# Patient Record
Sex: FEMALE | Race: WHITE | NOT HISPANIC OR LATINO | Employment: FULL TIME | URBAN - METROPOLITAN AREA
[De-identification: names, ages, dates, MRNs, and addresses within clinical notes are randomized per-mention and may not be internally consistent; named-entity substitution may affect disease eponyms.]

---

## 2017-01-02 ENCOUNTER — APPOINTMENT (OUTPATIENT)
Dept: GENERAL RADIOLOGY | Facility: HOSPITAL | Age: 37
End: 2017-01-02

## 2017-01-02 ENCOUNTER — HOSPITAL ENCOUNTER (EMERGENCY)
Facility: HOSPITAL | Age: 37
Discharge: HOME | End: 2017-01-03
Attending: EMERGENCY MEDICINE | Admitting: EMERGENCY MEDICINE

## 2017-01-02 DIAGNOSIS — R60.9 PERIPHERAL EDEMA: Primary | ICD-10-CM

## 2017-01-02 DIAGNOSIS — R06.02 SHORTNESS OF BREATH: ICD-10-CM

## 2017-01-02 LAB
ALBUMIN SERPL-MCNC: 4.5 G/DL (ref 3.5–5.2)
ALBUMIN/GLOB SERPL: 1.7 G/DL
ALP SERPL-CCNC: 75 U/L (ref 39–117)
ALT SERPL W P-5'-P-CCNC: 68 U/L (ref 1–33)
ANION GAP SERPL CALCULATED.3IONS-SCNC: 11.4 MMOL/L
AST SERPL-CCNC: 37 U/L (ref 1–32)
BASOPHILS # BLD AUTO: 0.04 10*3/MM3 (ref 0–0.2)
BASOPHILS NFR BLD AUTO: 0.4 % (ref 0–1.5)
BILIRUB SERPL-MCNC: 0.2 MG/DL (ref 0.1–1.2)
BILIRUB UR QL STRIP: NEGATIVE
BUN BLD-MCNC: 12 MG/DL (ref 6–20)
BUN/CREAT SERPL: 17.6 (ref 7–25)
CALCIUM SPEC-SCNC: 9.9 MG/DL (ref 8.6–10.5)
CHLORIDE SERPL-SCNC: 102 MMOL/L (ref 98–107)
CLARITY UR: CLEAR
CO2 SERPL-SCNC: 25.6 MMOL/L (ref 22–29)
COLOR UR: YELLOW
CREAT BLD-MCNC: 0.68 MG/DL (ref 0.57–1)
DEPRECATED RDW RBC AUTO: 41.3 FL (ref 37–54)
EOSINOPHIL # BLD AUTO: 0.4 10*3/MM3 (ref 0–0.7)
EOSINOPHIL NFR BLD AUTO: 4.3 % (ref 0.3–6.2)
ERYTHROCYTE [DISTWIDTH] IN BLOOD BY AUTOMATED COUNT: 12.2 % (ref 11.7–13)
GFR SERPL CREATININE-BSD FRML MDRD: 97 ML/MIN/1.73
GLOBULIN UR ELPH-MCNC: 2.6 GM/DL
GLUCOSE BLD-MCNC: 80 MG/DL (ref 65–99)
GLUCOSE UR STRIP-MCNC: NEGATIVE MG/DL
HCT VFR BLD AUTO: 39.3 % (ref 35.6–45.5)
HGB BLD-MCNC: 13.5 G/DL (ref 11.9–15.5)
HGB UR QL STRIP.AUTO: NEGATIVE
IMM GRANULOCYTES # BLD: 0.02 10*3/MM3 (ref 0–0.03)
IMM GRANULOCYTES NFR BLD: 0.2 % (ref 0–0.5)
KETONES UR QL STRIP: NEGATIVE
LEUKOCYTE ESTERASE UR QL STRIP.AUTO: NEGATIVE
LYMPHOCYTES # BLD AUTO: 2.53 10*3/MM3 (ref 0.9–4.8)
LYMPHOCYTES NFR BLD AUTO: 27.2 % (ref 19.6–45.3)
MCH RBC QN AUTO: 32.5 PG (ref 26.9–32)
MCHC RBC AUTO-ENTMCNC: 34.4 G/DL (ref 32.4–36.3)
MCV RBC AUTO: 94.5 FL (ref 80.5–98.2)
MONOCYTES # BLD AUTO: 0.65 10*3/MM3 (ref 0.2–1.2)
MONOCYTES NFR BLD AUTO: 7 % (ref 5–12)
NEUTROPHILS # BLD AUTO: 5.67 10*3/MM3 (ref 1.9–8.1)
NEUTROPHILS NFR BLD AUTO: 60.9 % (ref 42.7–76)
NITRITE UR QL STRIP: NEGATIVE
NT-PROBNP SERPL-MCNC: 28.9 PG/ML (ref 5–450)
PH UR STRIP.AUTO: 6 [PH] (ref 5–8)
PLATELET # BLD AUTO: 296 10*3/MM3 (ref 140–500)
PMV BLD AUTO: 9.5 FL (ref 6–12)
POTASSIUM BLD-SCNC: 4.1 MMOL/L (ref 3.5–5.2)
PROT SERPL-MCNC: 7.1 G/DL (ref 6–8.5)
PROT UR QL STRIP: NEGATIVE
RBC # BLD AUTO: 4.16 10*6/MM3 (ref 3.9–5.2)
SODIUM BLD-SCNC: 139 MMOL/L (ref 136–145)
SP GR UR STRIP: 1.01 (ref 1–1.03)
UROBILINOGEN UR QL STRIP: NORMAL
WBC NRBC COR # BLD: 9.31 10*3/MM3 (ref 4.5–10.7)

## 2017-01-02 PROCEDURE — 71020 HC CHEST PA AND LATERAL: CPT

## 2017-01-02 PROCEDURE — 83880 ASSAY OF NATRIURETIC PEPTIDE: CPT | Performed by: EMERGENCY MEDICINE

## 2017-01-02 PROCEDURE — 81003 URINALYSIS AUTO W/O SCOPE: CPT | Performed by: EMERGENCY MEDICINE

## 2017-01-02 PROCEDURE — 80053 COMPREHEN METABOLIC PANEL: CPT | Performed by: EMERGENCY MEDICINE

## 2017-01-02 PROCEDURE — 85025 COMPLETE CBC W/AUTO DIFF WBC: CPT | Performed by: EMERGENCY MEDICINE

## 2017-01-02 PROCEDURE — 99284 EMERGENCY DEPT VISIT MOD MDM: CPT

## 2017-01-02 RX ORDER — MIRTAZAPINE 30 MG/1
30 TABLET, FILM COATED ORAL NIGHTLY
COMMUNITY

## 2017-01-02 NOTE — ED NOTES
Patient to er with c/o swelling in bilateral hands and feet, patient stated this started two days ago.     Tien Dover RN  01/02/17 6672

## 2017-01-02 NOTE — ED NOTES
LEFT HAND SWELLING AND PAIN STARTED 2 DAYS AGO.  REDNESS TO HAND PT REPORTS HX OF CELLULITIS.     Juan Miguel Torres RN  01/02/17 1038

## 2017-01-03 VITALS
HEART RATE: 86 BPM | OXYGEN SATURATION: 96 % | DIASTOLIC BLOOD PRESSURE: 92 MMHG | SYSTOLIC BLOOD PRESSURE: 137 MMHG | WEIGHT: 145 LBS | HEIGHT: 61 IN | RESPIRATION RATE: 15 BRPM | TEMPERATURE: 98.9 F | BODY MASS INDEX: 27.38 KG/M2

## 2017-01-03 RX ORDER — FUROSEMIDE 20 MG/1
20 TABLET ORAL DAILY
Qty: 5 TABLET | Refills: 0 | Status: SHIPPED | OUTPATIENT
Start: 2017-01-03

## 2017-01-03 NOTE — DISCHARGE INSTRUCTIONS
Take medication as prescribed.  Follow-up with your doctor later this week.  Return to the emergency department for worsening shortness of breath, increased swelling, fever, or other concern.

## 2017-01-03 NOTE — ED PROVIDER NOTES
EMERGENCY DEPARTMENT ENCOUNTER    CHIEF COMPLAINT  Chief Complaint: swelling  History given by: Patient and family  History limited by: n/a  Room Number: 12/12  PMD: Provider Not In System      HPI:  Pt is a 36 y.o. female who presents complaining of swelling in both of her hands 12/31. Pt admits facial swelling and leg swelling the next day, SOB, sternal chest pain, cough. Pt denies unexpected weight gain. Pt reports walking worsens SOB.    Duration:  2 days  Timing: constant  Location: generalized  Radiation: all over body  Quality: swelling  Intensity/Severity: moderate  Progression: worsening  Associated Symptoms: see ROS  Aggravating Factors: none  Alleviating Factors: none  Previous Episodes: none  Treatment before arrival: none    PAST MEDICAL HISTORY  Active Ambulatory Problems     Diagnosis Date Noted   • No Active Ambulatory Problems     Resolved Ambulatory Problems     Diagnosis Date Noted   • No Resolved Ambulatory Problems     Past Medical History   Diagnosis Date   • Depression    • Seizures        PAST SURGICAL HISTORY  History reviewed. No pertinent past surgical history.    FAMILY HISTORY  History reviewed. No pertinent family history.    SOCIAL HISTORY  Social History     Social History   • Marital status: Single     Spouse name: N/A   • Number of children: N/A   • Years of education: N/A     Occupational History   • Not on file.     Social History Main Topics   • Smoking status: Light Tobacco Smoker   • Smokeless tobacco: Not on file   • Alcohol use Yes      Comment: occ   • Drug use: No   • Sexual activity: Not on file     Other Topics Concern   • Not on file     Social History Narrative   • No narrative on file       ALLERGIES  Review of patient's allergies indicates no known allergies.    REVIEW OF SYSTEMS  Review of Systems   Constitutional: Negative for fever.   Respiratory: Positive for cough and shortness of breath.    Cardiovascular: Positive for chest pain and leg swelling.    Gastrointestinal: Negative for abdominal pain.   Musculoskeletal: Negative for back pain.        Facial swelling and hand swelling   Skin: Negative for rash.   Neurological: Negative for headaches.   All other systems reviewed and are negative.      PHYSICAL EXAM  ED Triage Vitals   Temp Heart Rate Resp BP SpO2   01/02/17 1825 01/02/17 1825 01/02/17 1825 01/02/17 1842 01/02/17 1825   97.5 °F (36.4 °C) 119 16 141/85 98 %      Temp src Heart Rate Source Patient Position BP Location FiO2 (%)   01/02/17 1825 -- 01/02/17 1842 -- --   Tympanic  Sitting         Physical Exam   Constitutional: She is oriented to person, place, and time.   HENT:   Head: Normocephalic and atraumatic.   Eyes: Pupils are equal, round, and reactive to light.   Neck: Normal range of motion.   Cardiovascular: Normal rate, regular rhythm and normal heart sounds.    Pulmonary/Chest: Effort normal. She exhibits tenderness (over inferior sternum).   Abdominal: Soft. There is no tenderness.   Musculoskeletal: She exhibits edema.   Mild edema in hands, no redness  1+ edema in leg and ankles no redness   Neurological: She is alert and oriented to person, place, and time.   Skin: Skin is warm and dry. No rash noted.   Nursing note and vitals reviewed.      LAB RESULTS  Lab Results (last 24 hours)     Procedure Component Value Units Date/Time    Urinalysis With / Culture If Indicated [[id number]]  (Normal) Collected:  01/02/17 1931    Specimen:  Urine from Urine, Clean Catch Updated:  01/02/17 1953     Color, UA Yellow      Appearance, UA Clear      pH, UA 6.0      Specific Gravity, UA 1.006      Glucose, UA Negative      Ketones, UA Negative      Bilirubin, UA Negative      Blood, UA Negative      Protein, UA Negative      Leuk Esterase, UA Negative      Nitrite, UA Negative      Urobilinogen, UA 0.2 E.U./dL     Narrative:       Urine microscopic not indicated.    CBC & Differential [[id number]] Collected:  01/02/17 2008    Specimen:  Blood Updated:   01/02/17 2026    Narrative:       The following orders were created for panel order CBC & Differential.  Procedure                               Abnormality         Status                     ---------                               -----------         ------                     CBC Auto Differential[[id number]]         Abnormal            Final result                 Please view results for these tests on the individual orders.    Comprehensive Metabolic Panel [[id number]]  (Abnormal) Collected:  01/02/17 2008    Specimen:  Blood Updated:  01/02/17 2047     Glucose 80 mg/dL      BUN 12 mg/dL      Creatinine 0.68 mg/dL      Sodium 139 mmol/L      Potassium 4.1 mmol/L      Chloride 102 mmol/L      CO2 25.6 mmol/L      Calcium 9.9 mg/dL      Total Protein 7.1 g/dL      Albumin 4.50 g/dL      ALT (SGPT) 68 (H) U/L      AST (SGOT) 37 (H) U/L      Alkaline Phosphatase 75 U/L      Total Bilirubin 0.2 mg/dL      eGFR Non African Amer 97 mL/min/1.73      Globulin 2.6 gm/dL      A/G Ratio 1.7 g/dL      BUN/Creatinine Ratio 17.6      Anion Gap 11.4 mmol/L     CBC Auto Differential [[id number]]  (Abnormal) Collected:  01/02/17 2008    Specimen:  Blood Updated:  01/02/17 2026     WBC 9.31 10*3/mm3      RBC 4.16 10*6/mm3      Hemoglobin 13.5 g/dL      Hematocrit 39.3 %      MCV 94.5 fL      MCH 32.5 (H) pg      MCHC 34.4 g/dL      RDW 12.2 %      RDW-SD 41.3 fl      MPV 9.5 fL      Platelets 296 10*3/mm3      Neutrophil % 60.9 %      Lymphocyte % 27.2 %      Monocyte % 7.0 %      Eosinophil % 4.3 %      Basophil % 0.4 %      Immature Grans % 0.2 %      Neutrophils, Absolute 5.67 10*3/mm3      Lymphocytes, Absolute 2.53 10*3/mm3      Monocytes, Absolute 0.65 10*3/mm3      Eosinophils, Absolute 0.40 10*3/mm3      Basophils, Absolute 0.04 10*3/mm3      Immature Grans, Absolute 0.02 10*3/mm3     BNP [[id number]]  (Normal) Collected:  01/02/17 2008    Specimen:  Blood Updated:  01/02/17 2329     proBNP 28.9 pg/mL     Narrative:       Among  patients with dyspnea, NT-proBNP is highly sensitive for the detection of acute congestive heart failure. In addition NT-proBNP of <300 pg/ml effectively rules out acute congestive heart failure with 99% negative predictive value.          I ordered the above labs and reviewed the results    RADIOLOGY  XR Chest 2 View    (Results Pending)   CXR is negative     I ordered the above noted radiological studies. Interpreted by radiologist. Reviewed by me in PACS.       PROCEDURES  Procedures      PROGRESS AND CONSULTS  ED Course     1847  Ordered labs and blood work for further evaluation.    2305  Ordered CXR and hCG for further evaluation.    0134  Rechecked pt. Pt is resting and appears well. Notified pt of unremarkable labs and CXR. Discussed the plan to discharge pt home with lasix. Pt and family agree with the plan. RTER warnings given.    Dr. Becki Sparrow is PCP    MEDICAL DECISION MAKING  Results were reviewed/discussed with the patient and they were also made aware of online access. Pt also made aware that some labs, such as cultures, will not be resulted during ER visit and follow up with PMD is necessary.     MDM  Number of Diagnoses or Management Options  Peripheral edema:   Shortness of breath:   Diagnosis management comments: Patient presented to the ER complaining of swelling in her hands, feet, and face.  She also reports mild shortness of breath.  On exam, I did not appreciate any facial edema.  She did have mild edema of her hands, and, and ankles.  Her labs were unremarkable.  She did not have any protein in her urine .Chest x-ray was negative.  Patient will be discharged with a prescription for Lasix.       Amount and/or Complexity of Data Reviewed  Clinical lab tests: reviewed and ordered  Tests in the radiology section of CPT®: reviewed and ordered  Decide to obtain previous medical records or to obtain history from someone other than the patient: yes  Review and summarize past medical records: yes  (admitted 8/16 for seizure and acute kidney injury)           DIAGNOSIS  Final diagnoses:   Peripheral edema   Shortness of breath       DISPOSITION  DISCHARGE    Patient discharged in stable condition.    Reviewed implications of results, diagnosis, meds, responsibility to follow up, warning signs and symptoms of possible worsening, potential complications and reasons to return to ER, including worsening swelling.    Patient/Family voiced understanding of above instructions.    Discussed plan for discharge, as there is no emergent indication for admission.  Pt/family is agreeable and understands need for follow up and repeat testing.  Pt is aware that discharge does not mean that nothing is wrong but it indicates no emergency is present that requires admission and they must continue care with follow-up as given below or physician of their choice.     FOLLOW-UP  Provider Not In System  [place] [id number]    In 3 days           Medication List      New Prescriptions          furosemide 20 MG tablet   Commonly known as:  LASIX   Take 1 tablet by mouth Daily.               Latest Documented Vital Signs:  As of 1:34 AM  BP- 137/92 HR- 87 Temp- 98.9 °F (37.2 °C) O2 sat- 100%    --  Documentation assistance provided by doug Choudhury for Dr. Quan.  Information recorded by the scribe was done at my direction and has been verified and validated by me.          Cristino Choudhury  01/03/17 0143       Rajendra Quan MD  01/03/17 0236